# Patient Record
Sex: MALE | Race: WHITE | URBAN - METROPOLITAN AREA
[De-identification: names, ages, dates, MRNs, and addresses within clinical notes are randomized per-mention and may not be internally consistent; named-entity substitution may affect disease eponyms.]

---

## 2017-10-06 ENCOUNTER — IMMUNIZATION (OUTPATIENT)
Dept: FAMILY MEDICINE CLINIC | Facility: CLINIC | Age: 37
End: 2017-10-06

## 2017-10-06 DIAGNOSIS — Z23 NEED FOR VACCINATION: ICD-10-CM

## 2017-10-06 PROCEDURE — 90686 IIV4 VACC NO PRSV 0.5 ML IM: CPT | Performed by: NURSE PRACTITIONER

## 2017-10-06 PROCEDURE — 90471 IMMUNIZATION ADMIN: CPT | Performed by: NURSE PRACTITIONER

## 2018-01-09 PROBLEM — J45.20 MILD INTERMITTENT ASTHMA WITHOUT COMPLICATION: Status: ACTIVE | Noted: 2018-01-09

## 2018-01-09 PROBLEM — J45.20 MILD INTERMITTENT ASTHMA WITHOUT COMPLICATION (HCC): Status: ACTIVE | Noted: 2018-01-09

## 2018-11-15 PROCEDURE — 86431 RHEUMATOID FACTOR QUANT: CPT | Performed by: INTERNAL MEDICINE

## 2024-09-05 PROBLEM — J30.2 SEASONAL ALLERGIES: Status: ACTIVE | Noted: 2018-01-22

## 2024-10-01 ENCOUNTER — HOSPITAL ENCOUNTER (OUTPATIENT)
Dept: ULTRASOUND IMAGING | Age: 44
Discharge: HOME | End: 2024-10-01
Attending: INTERNAL MEDICINE
Payer: COMMERCIAL

## 2024-10-01 DIAGNOSIS — R14.0 BLOATING: ICD-10-CM

## 2024-10-01 PROCEDURE — 76700 US EXAM ABDOM COMPLETE: CPT | Performed by: INTERNAL MEDICINE

## 2024-11-18 ENCOUNTER — OFFICE VISIT (OUTPATIENT)
Dept: SURGERY | Facility: CLINIC | Age: 44
End: 2024-11-18
Payer: COMMERCIAL

## 2024-11-18 DIAGNOSIS — N50.811 PAIN IN RIGHT TESTICLE: Primary | ICD-10-CM

## 2024-11-18 PROCEDURE — 99204 OFFICE O/P NEW MOD 45 MIN: CPT | Performed by: UROLOGY

## 2024-11-18 NOTE — PROGRESS NOTES
SUBJECTIVE:  Tony Eddy is a 43 year old male who presents for a consultation at the request of, and a copy of this note will be sent to, Liz Villegas, for evaluation of  right testis pain. He states that the problem is unchanged. Symptoms include intermittent achiness along the right posterior aspect of the scrotum.  Exacerbated by prolonged sitting.  No other exacerbating or improving factors.  No urination complaints.  IPSS score 5 quality-of-life index is 1.  No previous history of trauma.  Symptoms ongoing for at least 1 to 2 years and stable.. He denies any other complaints.    Allergies: Allergies[1]    History:  Past Medical History:    Abdominal distention    Bloating    Flatulence/gas pain/belching    Indigestion      No past surgical history on file.   Family History   Problem Relation Age of Onset    Other (Other) Father         rheumatoid arthritis      Social History:   Social History     Socioeconomic History    Marital status:    Tobacco Use    Smoking status: Never    Smokeless tobacco: Never   Substance and Sexual Activity    Alcohol use: Yes     Alcohol/week: 8.0 standard drinks of alcohol     Types: 4 Glasses of wine, 4 Cans of beer per week     Comment: socially 3-4 days a week      Drug use: Never     Social Drivers of Health      Received from 1calendar    [facility]            REVIEW OF SYSTEMS:  RESPIRATORY:  Negative for chest tightness, wheezing, cough, shortness of breath,  sputum production,chest pain or pleurisy.  CARDIOVASCULAR:  Negative for pain or chest discomfort, dizziness or fainting, palpitations, leg swelling, nocturia, or claudication.  GASTROINTESTINAL:  Negative for nausea, vomiting, diarrhea, constipation, heartburn or indigestion, abdominal pains, bloody or tarry stools.  GENERAL: Denies:  weight gain, weight loss, fever, night sweats, bone pain, malaise, and fatigue. Positive for:  none.  All other review of systems reviewed and otherwise  negative    OBJECTIVE:  There were no vitals taken for this visit.  He appears well, in no apparent distress.  Alert and oriented times three, pleasant and cooperative.  CARDIOVASCULAR:normal apical impulse  RESPIRATORY: no chest wall deformities or tenderness  ABDOMEN: abdomen is soft without significant tenderness, masses, organomegaly or guarding  GENITOURINARY:      Penis: no penile lesions or discharge. Meatus normal location and size.      Scrotum: normal in appearance      Right Epididymis and Vas: both are palpably normal.      Right Testis: normal        Left Epididymis and Vas: both are palpably normal.      Left Testis: normal        Inguinal Lymph Nodes: non-palpable both      Skin exam grossly normal  Intact neurologically grossly  ASSESSMENT/PLAN  Encounter Diagnosis   Name Primary?    Pain in right testicle Yes   Recommend:  - Scrotal ultrasound and follow-up in 4 weeks to review results.    No orders of the defined types were placed in this encounter.      Meds This Visit:  Requested Prescriptions      No prescriptions requested or ordered in this encounter       Imaging & Referrals:  US SCROTUM W/ DOPPLER (CPT=93975/61264)                        [1]   Allergies  Allergen Reactions    Seasonal OTHER (SEE COMMENTS)     Ragweed, oak, seasonal/environmental

## 2024-11-20 ENCOUNTER — OFFICE VISIT (OUTPATIENT)
Facility: LOCATION | Age: 44
End: 2024-11-20
Payer: COMMERCIAL

## 2024-11-20 VITALS
SYSTOLIC BLOOD PRESSURE: 109 MMHG | HEART RATE: 60 BPM | HEIGHT: 71 IN | BODY MASS INDEX: 22.4 KG/M2 | DIASTOLIC BLOOD PRESSURE: 70 MMHG | OXYGEN SATURATION: 99 % | WEIGHT: 160 LBS | TEMPERATURE: 97 F

## 2024-11-20 DIAGNOSIS — K83.8 BILIARY SLUDGE DETERMINED BY ULTRASOUND: Primary | ICD-10-CM

## 2024-11-20 NOTE — H&P
New Patient Visit Note       Active Problems      1. Biliary sludge determined by ultrasound        Chief Complaint   Chief Complaint   Patient presents with    New Patient     NP- 10/01 US Abdomen, Gallbladder sludge, minimal bloating, no symptoms.          History of Present Illness     The patient presents at the request of his primary care physician and gastroenterologist for evaluation of biliary sludge.  The patient has recent history of abdominal bloating and excessive flatulence and gassiness.  He has had extensive workup.  Ultrasound of the abdomen reveals biliary sludge and small gallstones without evidence of cholecystitis.  I reviewed the images and discussed the findings with the patient.    The patient denies any postprandial pain, epigastric pain, right upper quadrant pain or any pain or discomfort radiating to his back chest or shoulder.    The patient denies fever, chills, chest pain, shortness of breath, dyspnea. The patient also denies hematemesis, melena, or hematochezia. The patient denies change in bowel or bladder habits. There is no complaint of hematuria or dysuria.  He denies scleral icterus, jaundice, acholic stool, dark urine.    Allergies  Tony is allergic to seasonal.    Past Medical / Surgical / Social / Family History    The past medical and past surgical history have been reviewed by me today.    Past Medical History:    Abdominal distention    Bloating    Flatulence/gas pain/belching    Indigestion     History reviewed. No pertinent surgical history.    The family history and social history have been reviewed by me today.    Family History   Problem Relation Age of Onset    Other (Other) Father         rheumatoid arthritis     Social History     Socioeconomic History    Marital status:    Tobacco Use    Smoking status: Never    Smokeless tobacco: Never   Substance and Sexual Activity    Alcohol use: Yes     Alcohol/week: 8.0 standard drinks of alcohol     Types: 4 Glasses  of wine, 4 Cans of beer per week     Comment: socially 3-4 days a week      Drug use: Never        Current Outpatient Medications:     polyethylene glycol, PEG 3350-KCl-NaBcb-NaCl-NaSulf, 236 g Oral Recon Soln, Take as directed by physician, Disp: 4000 mL, Rfl: 0    escitalopram (LEXAPRO) 10 MG Oral Tab, Take 1 tablet (10 mg total) by mouth daily., Disp: , Rfl:     Multiple Vitamin (ONE-A-DAY MENS) Oral Tab, Take by mouth., Disp: , Rfl:     Probiotic Product (PROBIOTIC-10 OR), Take by mouth., Disp: , Rfl:     omega-3 fatty acids 1000 MG Oral Cap, Take 1,000 mg by mouth daily., Disp: , Rfl:     Albuterol Sulfate  (90 Base) MCG/ACT Inhalation Aero Soln, Inhale 2 puffs into the lungs every 6 (six) hours as needed. (Patient not taking: Reported on 11/20/2024), Disp: 1 Inhaler, Rfl: 3      Review of Systems  The Review of Systems has been reviewed by me during today.  Review of Systems   Constitutional:  Negative for chills, diaphoresis, fatigue, fever and unexpected weight change.   HENT:  Negative for hearing loss, nosebleeds, sore throat and trouble swallowing.    Respiratory:  Negative for apnea, cough, shortness of breath and wheezing.    Cardiovascular:  Negative for chest pain, palpitations and leg swelling.   Gastrointestinal:  Negative for abdominal distention, abdominal pain, anal bleeding, blood in stool, constipation, diarrhea, nausea and vomiting.   Genitourinary:  Negative for difficulty urinating, dysuria, frequency and urgency.   Musculoskeletal:  Negative for arthralgias and myalgias.   Skin:  Negative for color change and rash.   Neurological:  Negative for tremors, syncope and weakness.   Hematological:  Negative for adenopathy. Does not bruise/bleed easily.   Psychiatric/Behavioral:  Negative for behavioral problems and sleep disturbance.        Physical Findings   /70 (BP Location: Right arm, Patient Position: Sitting, Cuff Size: adult)   Pulse 60   Temp 97 °F (36.1 °C) (Temporal)    Ht 71\"   Wt 160 lb (72.6 kg)   SpO2 99%   BMI 22.32 kg/m²   Physical Exam  Vitals and nursing note reviewed.   Constitutional:       General: He is not in acute distress.     Appearance: He is well-developed.   HENT:      Head: Normocephalic and atraumatic.   Eyes:      General: No scleral icterus.     Pupils: Pupils are equal, round, and reactive to light.   Neck:      Vascular: No JVD.      Trachea: No tracheal deviation.   Cardiovascular:      Rate and Rhythm: Normal rate and regular rhythm.   Pulmonary:      Effort: Pulmonary effort is normal. No respiratory distress.      Breath sounds: No stridor.   Abdominal:      General: Bowel sounds are normal. There is no distension.      Palpations: Abdomen is soft. Abdomen is not rigid. There is no mass.      Tenderness: There is no abdominal tenderness. There is no guarding or rebound. Negative signs include Connor's sign and McBurney's sign.      Hernia: No hernia is present.   Musculoskeletal:         General: Normal range of motion.      Cervical back: Normal range of motion and neck supple.   Skin:     General: Skin is warm and dry.   Neurological:      Mental Status: He is alert and oriented to person, place, and time.   Psychiatric:         Behavior: Behavior normal.             Assessment   1. Biliary sludge determined by ultrasound          Plan     The patient has complaints of abdominal bloating and flatulence but no postprandial pain or signs or symptoms attributable to biliary etiology.    The patient has incidentally identified biliary sludge identified by ultrasound without secondary signs of acute or chronic cholecystitis.    No surgical intervention warranted as the patient's symptoms are atypical for biliary etiology.    I encouraged the patient to continue medical workup at the direction of his gastroenterologist and to follow-up with him for ongoing care management options.    The patient will return to my attention on as-needed basis.    The  patient was provided ample opportunity to ask questions.  All of the patient's questions were answered in detail.  The patient voiced understanding of the care plan.     No orders of the defined types were placed in this encounter.      Imaging & Referrals   None    Follow Up  No follow-ups on file.    Freddie Romeo MD

## 2024-12-27 ENCOUNTER — HOSPITAL ENCOUNTER (OUTPATIENT)
Dept: ULTRASOUND IMAGING | Age: 44
Discharge: HOME | End: 2024-12-27
Attending: UROLOGY
Payer: COMMERCIAL

## 2024-12-27 DIAGNOSIS — N50.811 PAIN IN RIGHT TESTICLE: ICD-10-CM

## 2024-12-27 PROCEDURE — 93975 VASCULAR STUDY: CPT | Performed by: UROLOGY

## 2024-12-27 PROCEDURE — 76870 US EXAM SCROTUM: CPT | Performed by: UROLOGY

## (undated) NOTE — LETTER
24    Patient: Tony Eddy  : 1980 Visit date: 2024    Dear  Anais Klein    Thank you for referring Tony Eddy to my practice.  Please find my assessment and plan below.     Assessment   1. Biliary sludge determined by ultrasound          Plan     The patient has complaints of abdominal bloating and flatulence but no postprandial pain or signs or symptoms attributable to biliary etiology.    The patient has incidentally identified biliary sludge identified by ultrasound without secondary signs of acute or chronic cholecystitis.    No surgical intervention warranted as the patient's symptoms are atypical for biliary etiology.    I encouraged the patient to continue medical workup at the direction of his gastroenterologist and to follow-up with him for ongoing care management options.    The patient will return to my attention on as-needed basis.    The patient was provided ample opportunity to ask questions.  All of the patient's questions were answered in detail.  The patient voiced understanding of the care plan.      Sincerely,       Freddie Romeo MD   CC: No Recipients